# Patient Record
Sex: FEMALE | Race: WHITE | Employment: FULL TIME | URBAN - NONMETROPOLITAN AREA
[De-identification: names, ages, dates, MRNs, and addresses within clinical notes are randomized per-mention and may not be internally consistent; named-entity substitution may affect disease eponyms.]

---

## 2024-06-17 ENCOUNTER — TELEPHONE (OUTPATIENT)
Dept: SURGERY | Age: 59
End: 2024-06-17

## 2024-06-17 RX ORDER — CLOPIDOGREL BISULFATE 75 MG/1
TABLET ORAL
COMMUNITY
Start: 2024-06-09

## 2024-06-17 RX ORDER — ATORVASTATIN CALCIUM 80 MG/1
TABLET, FILM COATED ORAL
COMMUNITY
Start: 2024-06-09

## 2024-06-17 RX ORDER — METHYLPREDNISOLONE 4 MG/1
TABLET ORAL
COMMUNITY
Start: 2024-05-13

## 2024-06-17 RX ORDER — HYDROCODONE BITARTRATE AND ACETAMINOPHEN 5; 325 MG/1; MG/1
TABLET ORAL
COMMUNITY
Start: 2024-05-13

## 2024-06-17 RX ORDER — CITALOPRAM 20 MG/1
TABLET ORAL
COMMUNITY
Start: 2024-06-09

## 2024-06-25 ENCOUNTER — HOSPITAL ENCOUNTER (OUTPATIENT)
Dept: NEUROLOGY | Age: 59
Discharge: HOME | End: 2024-06-25
Payer: COMMERCIAL

## 2024-06-25 PROCEDURE — 95886 MUSC TEST DONE W/N TEST COMP: CPT

## 2024-06-25 PROCEDURE — 95910 NRV CNDJ TEST 7-8 STUDIES: CPT

## 2024-06-25 NOTE — PROCEDURES
[id number] [place] [address]                   EMG/NERVE CONDUCTION STUDIES REPORT      PATIENT NAME: LATHA MONTGOMERY          : 1965  MED REC NO: [id number]                         ROOM:   ACCOUNT NO: [id number]                       ADMIT DATE: 2024    PROVIDER: Arpit Jacobo MD      REFERRING PHYSICIAN:  PRIYANKA LYNN      TECHNICAL SUMMARY:  The nature, purpose, goals, expectations, and process involved in the procedures of nerve conduction studies and needle electromyography were reviewed, discussed, explained and verbal consent was obtained from the patient.  The patient's questions were answered with reference to the above processes and procedures.    There were no significant technical difficulties encountered during this study, and nerve conduction studies were performed under temperature monitoring.      CLINICAL DATA:      The patient is 58 years old with history of pain in the left hand, left wrist and thumb, numbness, tingling, paresthesias, decreased handgrip.  These symptoms are getting progressively worse.  The patient works in a factory.    The purpose of the study is to evaluate for mononeuropathy and radiculopathy.      SUMMARY:      The sensory nerve action potentials of the left radial nerve are within normal limits.    Sensory nerve action potentials of the left median nerve shows low amplitude with prolonged distal latencies.    Sensory nerve action potentials of the left ulnar nerve within normal limits.    Mixed nerve palmar potentials of the left median nerve shows low amplitude with increased [id number] distal latency difference.  Left ulnar and palmar potentials are within normal limits.    Compound muscle action potentials of the left median nerve shows normal amplitude, distal latency, and conduction velocity.  Compound muscle action potentials of the left ulnar nerve shows mildly

## 2024-06-25 NOTE — PROGRESS NOTES
EMG/NCS Left    upper Completed    PCP: None, None    Ordering: Reggie Pruitt MD    Interpreting Physician: Arpit Jacobo, Neurologist    Technician: Kim Lance RN

## 2024-09-10 ENCOUNTER — OFFICE VISIT (OUTPATIENT)
Dept: ORTHOPEDIC SURGERY | Age: 59
End: 2024-09-10

## 2024-09-10 VITALS
WEIGHT: 129 LBS | HEART RATE: 66 BPM | BODY MASS INDEX: 19.11 KG/M2 | DIASTOLIC BLOOD PRESSURE: 60 MMHG | HEIGHT: 69 IN | SYSTOLIC BLOOD PRESSURE: 112 MMHG | OXYGEN SATURATION: 97 %

## 2024-09-10 DIAGNOSIS — G56.01 RIGHT CARPAL TUNNEL SYNDROME: Primary | ICD-10-CM

## 2024-09-10 PROCEDURE — 99024 POSTOP FOLLOW-UP VISIT: CPT | Performed by: NURSE PRACTITIONER

## 2024-09-10 RX ORDER — ASPIRIN 81 MG/1
81 TABLET ORAL DAILY
COMMUNITY

## 2025-06-30 ENCOUNTER — TELEPHONE (OUTPATIENT)
Dept: SURGERY | Age: 60
End: 2025-06-30

## 2025-07-01 VITALS
OXYGEN SATURATION: 96 % | SYSTOLIC BLOOD PRESSURE: 118 MMHG | TEMPERATURE: 97.4 F | HEART RATE: 66 BPM | WEIGHT: 132.28 LBS | DIASTOLIC BLOOD PRESSURE: 68 MMHG | BODY MASS INDEX: 19.53 KG/M2

## 2025-07-01 PROBLEM — I63.9 CEREBROVASCULAR ACCIDENT (CVA) (HCC): Status: ACTIVE | Noted: 2021-06-12

## 2025-07-01 PROBLEM — Z86.59 HISTORY OF ANXIETY: Status: ACTIVE | Noted: 2024-02-27

## 2025-07-01 PROBLEM — E78.5 DYSLIPIDEMIA: Status: ACTIVE | Noted: 2017-03-07

## 2025-07-01 PROBLEM — F32.A DEPRESSION: Status: ACTIVE | Noted: 2024-02-27

## 2025-07-01 PROBLEM — Z82.49 FAMILY HISTORY OF EARLY CAD: Status: ACTIVE | Noted: 2025-07-01

## 2025-07-01 PROBLEM — Z86.73 HISTORY OF STROKE: Status: ACTIVE | Noted: 2021-06-01

## 2025-07-01 PROBLEM — I65.22 STENOSIS OF LEFT CAROTID ARTERY: Status: ACTIVE | Noted: 2021-06-01

## 2025-07-01 PROBLEM — Z72.0 TOBACCO ABUSE: Status: ACTIVE | Noted: 2024-02-27

## 2025-07-01 RX ORDER — NICOTINE 21 MG/24HR
1 PATCH, TRANSDERMAL 24 HOURS TRANSDERMAL EVERY 24 HOURS
COMMUNITY
Start: 2025-06-27 | End: 2025-07-11

## 2025-07-03 ENCOUNTER — TRANSCRIBE ORDERS (OUTPATIENT)
Dept: GENERAL RADIOLOGY | Age: 60
End: 2025-07-03

## 2025-07-03 DIAGNOSIS — E04.2 MULTIPLE THYROID NODULES: Primary | ICD-10-CM

## 2025-07-03 DIAGNOSIS — Z12.31 ENCOUNTER FOR SCREENING MAMMOGRAM FOR MALIGNANT NEOPLASM OF BREAST: ICD-10-CM

## 2025-07-03 DIAGNOSIS — S22.000A COMPRESSION FRACTURE OF THORACIC VERTEBRA, UNSPECIFIED THORACIC VERTEBRAL LEVEL, INITIAL ENCOUNTER (HCC): ICD-10-CM

## 2025-08-05 ENCOUNTER — HOSPITAL ENCOUNTER (OUTPATIENT)
Dept: BONE DENSITY | Age: 60
Discharge: HOME | End: 2025-08-07
Attending: FAMILY MEDICINE
Payer: MEDICAID

## 2025-08-05 ENCOUNTER — HOSPITAL ENCOUNTER (OUTPATIENT)
Dept: MAMMOGRAPHY | Age: 60
Discharge: HOME | End: 2025-08-07
Attending: FAMILY MEDICINE
Payer: MEDICAID

## 2025-08-05 ENCOUNTER — HOSPITAL ENCOUNTER (OUTPATIENT)
Dept: ULTRASOUND IMAGING | Age: 60
Discharge: HOME | End: 2025-08-07
Attending: FAMILY MEDICINE
Payer: MEDICAID

## 2025-08-05 VITALS — HEIGHT: 64 IN | WEIGHT: 132 LBS | BODY MASS INDEX: 22.53 KG/M2

## 2025-08-05 DIAGNOSIS — E04.2 MULTIPLE THYROID NODULES: ICD-10-CM

## 2025-08-05 DIAGNOSIS — Z12.31 ENCOUNTER FOR SCREENING MAMMOGRAM FOR MALIGNANT NEOPLASM OF BREAST: ICD-10-CM

## 2025-08-05 DIAGNOSIS — S22.000A COMPRESSION FRACTURE OF THORACIC VERTEBRA, UNSPECIFIED THORACIC VERTEBRAL LEVEL, INITIAL ENCOUNTER (HCC): ICD-10-CM

## 2025-08-05 PROCEDURE — 76536 US EXAM OF HEAD AND NECK: CPT

## 2025-08-05 PROCEDURE — 77080 DXA BONE DENSITY AXIAL: CPT

## 2025-08-05 PROCEDURE — 77063 BREAST TOMOSYNTHESIS BI: CPT
